# Patient Record
(demographics unavailable — no encounter records)

---

## 2024-10-14 NOTE — DISCUSSION/SUMMARY
[Normal Growth] : growth [Normal Development] : development [None] : No known medical problems [No Elimination Concerns] : elimination [No Feeding Concerns] : feeding [No Skin Concerns] : skin [Normal Sleep Pattern] : sleep [No Medications] : ~He/She~ is not on any medications [Patient] : patient [Full Activity without restrictions including Physical Education & Athletics] : Full Activity without restrictions including Physical Education & Athletics [] : The components of the vaccine(s) to be administered today are listed in the plan of care. The disease(s) for which the vaccine(s) are intended to prevent and the risks have been discussed with the caretaker.  The risks are also included in the appropriate vaccination information statements which have been provided to the patient's caregiver.  The caregiver has given consent to vaccinate. [FreeTextEntry1] : 7 year female here for well-visit, appropriate growth and development observed. Continue balanced diet with all food groups. Brush teeth twice a day with toothbrush. Recommend visit to dentist. Help child to maintain consistent daily routines and sleep schedule. School discussed. Ensure home is safe. Teach child about personal safety. Use consistent, positive discipline. Limit screen time to less than 2 hours per day. Encourage physical activity. Child needs to ride in a belt-positioning booster seat until  4 feet 9 inches has been reached and are between 8 and 12 years of age.  Physical exam is within normal limits . immunizations were discussed and child is up to date .  ENLARGED tonsils-  slight snoring but not frequently sick- consider referral to ENT  Return 1 year for routine well child check.

## 2024-10-14 NOTE — HISTORY OF PRESENT ILLNESS
[Father] : father [Eats healthy meals and snacks] : eats healthy meals and snacks [Eats meals with family] : eats meals with family [Normal] : Normal [In own bed] : In own bed [Brushing teeth twice/d] : brushing teeth twice per day [Yes] : Patient goes to dentist yearly [Vitamin] : Primary Fluoride Source: Vitamin [Playtime (60 min/d)] : playtime 60 min a day [Participates in after-school activities] : participates in after-school activities [< 2 hrs of screen time per day] : less than 2 hrs of screen time per day [Appropiate parent-child-sibling interaction] : appropriate parent-child-sibling interaction [Does chores when asked] : does chores when asked [Has Friends] : has friends [Grade ___] : Grade [unfilled] [Adequate social interactions] : adequate social interactions [Adequate behavior] : adequate behavior [Adequate performance] : adequate performance [Adequate attention] : adequate attention [No] : No cigarette smoke exposure [FreeTextEntry9] : dance/ sports- soccer, [employer]/ play pretend/ arts and crafts/ legos/ music/ brownie / bike/ swim  [de-identified] : math

## 2024-10-14 NOTE — PHYSICAL EXAM
[Alert] : alert [No Acute Distress] : no acute distress [Normocephalic] : normocephalic [Conjunctivae with no discharge] : conjunctivae with no discharge [PERRL] : PERRL [EOMI Bilateral] : EOMI bilateral [Auricles Well Formed] : auricles well formed [Clear Tympanic membranes with present light reflex and bony landmarks] : clear tympanic membranes with present light reflex and bony landmarks [No Discharge] : no discharge [Nares Patent] : nares patent [Pink Nasal Mucosa] : pink nasal mucosa [Palate Intact] : palate intact [Nonerythematous Oropharynx] : nonerythematous oropharynx [Supple, full passive range of motion] : supple, full passive range of motion [No Palpable Masses] : no palpable masses [Symmetric Chest Rise] : symmetric chest rise [Clear to Auscultation Bilaterally] : clear to auscultation bilaterally [Regular Rate and Rhythm] : regular rate and rhythm [Normal S1, S2 present] : normal S1, S2 present [No Murmurs] : no murmurs [+2 Femoral Pulses] : +2 femoral pulses [Soft] : soft [NonTender] : non tender [Non Distended] : non distended [Normoactive Bowel Sounds] : normoactive bowel sounds [No Hepatomegaly] : no hepatomegaly [No Splenomegaly] : no splenomegaly [Patent] : patent [No fissures] : no fissures [No Abnormal Lymph Nodes Palpated] : no abnormal lymph nodes palpated [No Gait Asymmetry] : no gait asymmetry [No pain or deformities with palpation of bone, muscles, joints] : no pain or deformities with palpation of bone, muscles, joints [Normal Muscle Tone] : normal muscle tone [Straight] : straight [+2 Patella DTR] : +2 patella DTR [Cranial Nerves Grossly Intact] : cranial nerves grossly intact [No Rash or Lesions] : no rash or lesions [Payam: _____] : Payam [unfilled] [No Scoliosis] : no scoliosis [de-identified] : enlarged tonsils grade 4